# Patient Record
Sex: MALE | Race: WHITE | NOT HISPANIC OR LATINO | Employment: FULL TIME | URBAN - METROPOLITAN AREA
[De-identification: names, ages, dates, MRNs, and addresses within clinical notes are randomized per-mention and may not be internally consistent; named-entity substitution may affect disease eponyms.]

---

## 2019-04-24 ENCOUNTER — HOSPITAL ENCOUNTER (INPATIENT)
Facility: MEDICAL CENTER | Age: 39
LOS: 2 days | DRG: 564 | End: 2019-04-26
Attending: EMERGENCY MEDICINE | Admitting: SURGERY
Payer: OTHER MISCELLANEOUS

## 2019-04-24 ENCOUNTER — APPOINTMENT (OUTPATIENT)
Dept: RADIOLOGY | Facility: MEDICAL CENTER | Age: 39
DRG: 564 | End: 2019-04-24
Attending: SURGERY
Payer: OTHER MISCELLANEOUS

## 2019-04-24 ENCOUNTER — HOSPITAL ENCOUNTER (OUTPATIENT)
Dept: RADIOLOGY | Facility: MEDICAL CENTER | Age: 39
End: 2019-04-24

## 2019-04-24 DIAGNOSIS — S02.92XA CRANIAL FACIAL FRACTURES (HCC): ICD-10-CM

## 2019-04-24 DIAGNOSIS — T14.90XA TRAUMA: ICD-10-CM

## 2019-04-24 DIAGNOSIS — S02.91XA CRANIAL FACIAL FRACTURES (HCC): ICD-10-CM

## 2019-04-24 DIAGNOSIS — S06.6X9A SUBARACHNOID HEMORRHAGE FOLLOWING INJURY, WITH LOSS OF CONSCIOUSNESS, INITIAL ENCOUNTER (HCC): ICD-10-CM

## 2019-04-24 PROBLEM — S06.5XAA SDH (SUBDURAL HEMATOMA) (HCC): Status: ACTIVE | Noted: 2019-04-24

## 2019-04-24 LAB
ABO GROUP BLD: NORMAL
ALBUMIN SERPL BCP-MCNC: 4.4 G/DL (ref 3.2–4.9)
ALBUMIN/GLOB SERPL: 1.7 G/DL
ALP SERPL-CCNC: 81 U/L (ref 30–99)
ALT SERPL-CCNC: 12 U/L (ref 2–50)
ANION GAP SERPL CALC-SCNC: 9 MMOL/L (ref 0–11.9)
APTT PPP: 31.7 SEC (ref 24.7–36)
AST SERPL-CCNC: 14 U/L (ref 12–45)
BILIRUB SERPL-MCNC: 0.7 MG/DL (ref 0.1–1.5)
BLD GP AB SCN SERPL QL: NORMAL
BUN SERPL-MCNC: 15 MG/DL (ref 8–22)
CALCIUM SERPL-MCNC: 9.7 MG/DL (ref 8.5–10.5)
CFT BLD TEG: 7.6 MIN (ref 5–10)
CHLORIDE SERPL-SCNC: 103 MMOL/L (ref 96–112)
CLOT ANGLE BLD TEG: 53 DEGREES (ref 53–72)
CLOT LYSIS 30M P MA LENFR BLD TEG: 0.1 % (ref 0–8)
CO2 SERPL-SCNC: 25 MMOL/L (ref 20–33)
CREAT SERPL-MCNC: 0.96 MG/DL (ref 0.5–1.4)
CT.EXTRINSIC BLD ROTEM: 2.9 MIN (ref 1–3)
ERYTHROCYTE [DISTWIDTH] IN BLOOD BY AUTOMATED COUNT: 38.5 FL (ref 35.9–50)
ETHANOL BLD-MCNC: 0 G/DL
GLOBULIN SER CALC-MCNC: 2.6 G/DL (ref 1.9–3.5)
GLUCOSE SERPL-MCNC: 116 MG/DL (ref 65–99)
HCT VFR BLD AUTO: 44.4 % (ref 42–52)
HGB BLD-MCNC: 15.4 G/DL (ref 14–18)
INR PPP: 1.12 (ref 0.87–1.13)
MCF BLD TEG: 57.3 MM (ref 50–70)
MCH RBC QN AUTO: 29.8 PG (ref 27–33)
MCHC RBC AUTO-ENTMCNC: 34.7 G/DL (ref 33.7–35.3)
MCV RBC AUTO: 85.9 FL (ref 81.4–97.8)
PA AA BLD-ACNC: 81.6 %
PA ADP BLD-ACNC: 11.6 %
PLATELET # BLD AUTO: 191 K/UL (ref 164–446)
PMV BLD AUTO: 9.5 FL (ref 9–12.9)
POTASSIUM SERPL-SCNC: 3.7 MMOL/L (ref 3.6–5.5)
PROT SERPL-MCNC: 7 G/DL (ref 6–8.2)
PROTHROMBIN TIME: 14.5 SEC (ref 12–14.6)
RBC # BLD AUTO: 5.17 M/UL (ref 4.7–6.1)
RH BLD: NORMAL
SODIUM SERPL-SCNC: 137 MMOL/L (ref 135–145)
TEG ALGORITHM TGALG: NORMAL
WBC # BLD AUTO: 11.8 K/UL (ref 4.8–10.8)

## 2019-04-24 PROCEDURE — 70450 CT HEAD/BRAIN W/O DYE: CPT

## 2019-04-24 PROCEDURE — 85610 PROTHROMBIN TIME: CPT

## 2019-04-24 PROCEDURE — 80053 COMPREHEN METABOLIC PANEL: CPT

## 2019-04-24 PROCEDURE — 86901 BLOOD TYPING SEROLOGIC RH(D): CPT

## 2019-04-24 PROCEDURE — 94760 N-INVAS EAR/PLS OXIMETRY 1: CPT

## 2019-04-24 PROCEDURE — 80307 DRUG TEST PRSMV CHEM ANLYZR: CPT

## 2019-04-24 PROCEDURE — 99291 CRITICAL CARE FIRST HOUR: CPT

## 2019-04-24 PROCEDURE — G0390 TRAUMA RESPONS W/HOSP CRITI: HCPCS

## 2019-04-24 PROCEDURE — 85384 FIBRINOGEN ACTIVITY: CPT

## 2019-04-24 PROCEDURE — 700105 HCHG RX REV CODE 258: Performed by: SURGERY

## 2019-04-24 PROCEDURE — 86900 BLOOD TYPING SEROLOGIC ABO: CPT

## 2019-04-24 PROCEDURE — 86850 RBC ANTIBODY SCREEN: CPT

## 2019-04-24 PROCEDURE — 85347 COAGULATION TIME ACTIVATED: CPT

## 2019-04-24 PROCEDURE — 85576 BLOOD PLATELET AGGREGATION: CPT | Mod: 91

## 2019-04-24 PROCEDURE — 770022 HCHG ROOM/CARE - ICU (200)

## 2019-04-24 PROCEDURE — 85027 COMPLETE CBC AUTOMATED: CPT

## 2019-04-24 PROCEDURE — 85730 THROMBOPLASTIN TIME PARTIAL: CPT

## 2019-04-24 RX ORDER — MORPHINE SULFATE 4 MG/ML
4 INJECTION, SOLUTION INTRAMUSCULAR; INTRAVENOUS
Status: DISCONTINUED | OUTPATIENT
Start: 2019-04-24 | End: 2019-04-26 | Stop reason: HOSPADM

## 2019-04-24 RX ORDER — POLYETHYLENE GLYCOL 3350 17 G/17G
1 POWDER, FOR SOLUTION ORAL 2 TIMES DAILY
Status: DISCONTINUED | OUTPATIENT
Start: 2019-04-24 | End: 2019-04-26 | Stop reason: HOSPADM

## 2019-04-24 RX ORDER — AMOXICILLIN 250 MG
1 CAPSULE ORAL NIGHTLY
Status: DISCONTINUED | OUTPATIENT
Start: 2019-04-24 | End: 2019-04-26 | Stop reason: HOSPADM

## 2019-04-24 RX ORDER — LEVETIRACETAM 500 MG/1
500 TABLET ORAL 2 TIMES DAILY
Status: DISCONTINUED | OUTPATIENT
Start: 2019-04-25 | End: 2019-04-26 | Stop reason: HOSPADM

## 2019-04-24 RX ORDER — FAMOTIDINE 20 MG/1
20 TABLET, FILM COATED ORAL 2 TIMES DAILY
Status: DISCONTINUED | OUTPATIENT
Start: 2019-04-24 | End: 2019-04-25

## 2019-04-24 RX ORDER — BISACODYL 10 MG
10 SUPPOSITORY, RECTAL RECTAL
Status: DISCONTINUED | OUTPATIENT
Start: 2019-04-24 | End: 2019-04-26 | Stop reason: HOSPADM

## 2019-04-24 RX ORDER — SODIUM CHLORIDE 9 MG/ML
INJECTION, SOLUTION INTRAVENOUS CONTINUOUS
Status: DISCONTINUED | OUTPATIENT
Start: 2019-04-24 | End: 2019-04-26 | Stop reason: HOSPADM

## 2019-04-24 RX ORDER — AMOXICILLIN 250 MG
1 CAPSULE ORAL
Status: DISCONTINUED | OUTPATIENT
Start: 2019-04-24 | End: 2019-04-26 | Stop reason: HOSPADM

## 2019-04-24 RX ORDER — ONDANSETRON 2 MG/ML
4 INJECTION INTRAMUSCULAR; INTRAVENOUS EVERY 4 HOURS PRN
Status: DISCONTINUED | OUTPATIENT
Start: 2019-04-24 | End: 2019-04-26 | Stop reason: HOSPADM

## 2019-04-24 RX ORDER — ENEMA 19; 7 G/133ML; G/133ML
1 ENEMA RECTAL
Status: DISCONTINUED | OUTPATIENT
Start: 2019-04-24 | End: 2019-04-26 | Stop reason: HOSPADM

## 2019-04-24 RX ORDER — DOCUSATE SODIUM 100 MG/1
100 CAPSULE, LIQUID FILLED ORAL 2 TIMES DAILY
Status: DISCONTINUED | OUTPATIENT
Start: 2019-04-24 | End: 2019-04-26 | Stop reason: HOSPADM

## 2019-04-24 RX ADMIN — SODIUM CHLORIDE: 9 INJECTION, SOLUTION INTRAVENOUS at 16:16

## 2019-04-24 ASSESSMENT — PATIENT HEALTH QUESTIONNAIRE - PHQ9
1. LITTLE INTEREST OR PLEASURE IN DOING THINGS: NOT AT ALL
1. LITTLE INTEREST OR PLEASURE IN DOING THINGS: NOT AT ALL
SUM OF ALL RESPONSES TO PHQ9 QUESTIONS 1 AND 2: 0
2. FEELING DOWN, DEPRESSED, IRRITABLE, OR HOPELESS: NOT AT ALL
2. FEELING DOWN, DEPRESSED, IRRITABLE, OR HOPELESS: NOT AT ALL
SUM OF ALL RESPONSES TO PHQ9 QUESTIONS 1 AND 2: 0

## 2019-04-24 ASSESSMENT — COGNITIVE AND FUNCTIONAL STATUS - GENERAL
SUGGESTED CMS G CODE MODIFIER DAILY ACTIVITY: CH
DAILY ACTIVITIY SCORE: 24
MOBILITY SCORE: 24
SUGGESTED CMS G CODE MODIFIER MOBILITY: CH

## 2019-04-24 ASSESSMENT — LIFESTYLE VARIABLES
EVER_SMOKED: YES
ALCOHOL_USE: NO

## 2019-04-24 ASSESSMENT — COPD QUESTIONNAIRES
DURING THE PAST 4 WEEKS HOW MUCH DID YOU FEEL SHORT OF BREATH: NONE/LITTLE OF THE TIME
HAVE YOU SMOKED AT LEAST 100 CIGARETTES IN YOUR ENTIRE LIFE: NO/DON'T KNOW
DO YOU EVER COUGH UP ANY MUCUS OR PHLEGM?: NO/ONLY WITH OCCASIONAL COLDS OR INFECTIONS
COPD SCREENING SCORE: 0
IN THE PAST 12 MONTHS DO YOU DO LESS THAN YOU USED TO BECAUSE OF YOUR BREATHING PROBLEMS: DISAGREE/UNSURE

## 2019-04-24 ASSESSMENT — PAIN SCALES - WONG BAKER: WONGBAKER_NUMERICALRESPONSE: HURTS A WHOLE LOT

## 2019-04-24 NOTE — CARE PLAN
Problem: Knowledge Deficit  Goal: Knowledge of disease process/condition, treatment plan, diagnostic tests, and medications will improve    Intervention: Assess knowledge level of disease process/condition, treatment plan, diagnostic tests, and medications  Pt updated on POC. Demonstrated understanding by teach back. All questions answered.       Problem: Pain Management  Goal: Pain level will decrease to patient's comfort goal    Intervention: Follow pain managment plan developed in collaboration with patient and Interdisciplinary Team  Assess pain Q2H and prn

## 2019-04-24 NOTE — ASSESSMENT & PLAN NOTE
Comminuted minimally displaced fractures involving the bilateral frontal bone, with pneumocephalus  Non displaced fracture medial wall right orbit  Non displaced fracture of left lateral orbital wall with underlying retro-orbital air.  Posterior wall frontal sinuses disrupted.  Pneumocephalus anterior to frontal lobes.  Non-operative management.   Watch for CSF leak on posterior fractures, if found notify Dr. Degroot.  Would change plan.  Dr. Degroot - Facial fracture.

## 2019-04-24 NOTE — PROGRESS NOTES
Med Rec Updated and Complete per Pt at bedside  Allergies Reviewed   No PO ABX last 30 days.    Pt denies RX or OTC medications at this time.    Revisions were made to the Med Rec regarding the following medications:     Flexeril, Motrin, and Percocet.    PharmD notified.

## 2019-04-24 NOTE — PROGRESS NOTES
1600 - patient admit to [facility]. Report received from [place] FREEMAN.   1630 - Admit profile and assessment completed.

## 2019-04-24 NOTE — LETTER
[facility] [address] [address]  Phone: Dept: [phone] - Fax:        [facility] Progress Report and Disability Certification  Date of Service: 4/24/2019   No Show:  No  Date / Time of Next Visit:     Claim Information   Patient Name: Cecilio Melton  Claim Number:     Employer: [employer]  Date of Injury:   4/24/2019   Insurer / TPA:   ID / SSN:    Occupation:  Diagnosis: Diagnoses of Trauma, Subarachnoid hemorrhage following injury, with loss of consciousness, initial encounter ([facility]), and Cranial facial fractures (HCC) were pertinent to this visit.    Medical Information   Related to Industrial Injury?    Subjective Complaints:      Objective Findings:     Pre-Existing Condition(s):     Assessment:        Status:    Permanent Disability:     Plan:      Diagnostics:      Comments:       Disability Information   Status:      From:     Through:   Restrictions are:     Physical Restrictions   Sitting:    Standing:    Stooping:    Bending:      Squatting:    Walking:    Climbing:    Pushing:      Pulling:    Other:    Reaching Above Shoulder (L):   Reaching Above Shoulder (R):       Reaching Below Shoulder (L):    Reaching Below Shoulder (R):      Not to exceed Weight Limits   Carrying(hrs):   Weight Limit(lb):   Lifting(hrs):   Weight  Limit(lb):     Comments:      Repetitive Actions   Hands: i.e. Fine Manipulations from Grasping:     Feet: i.e. Operating Foot Controls:     Driving / Operate Machinery:     Physician Name: Serjio Osei Physician Signature:   e-Signature:  , Medical Director   Clinic Name / Location: [address] [id number] [phone]     Clinic Phone Number: Dept: [phone]   Appointment Time:  Visit Start Time:    Check-In Time:  3:03 PM Visit Discharge Time: 9:30 AM   Original-Treating Physician or Chiropractor    Page 2-Insurer/TPA    Page  3-Employer    Page 4-Employee

## 2019-04-24 NOTE — LETTER
[facility]   [address] [place] [address]  Phone: Dept: [phone] - Fax:        [facility] Progress Report and Disability Certification  Date of Service: 4/24/2019   No Show:  No  Date / Time of Next Visit:     Claim Information   Patient Name: Cecilio Melton  Claim Number:     Employer: [employer]  Date of Injury:      Insurer / TPA:   ID / SSN: [id number]    Occupation:  Diagnosis: There were no encounter diagnoses.    Medical Information   Related to Industrial Injury?   ***   Subjective Complaints:      Objective Findings:     Pre-Existing Condition(s):     Assessment:        Status:    Permanent Disability:     Plan:      Diagnostics:      Comments:       Disability Information   Status:      From:     Through:   Restrictions are:     Physical Restrictions   Sitting:    Standing:    Stooping:    Bending:      Squatting:    Walking:    Climbing:    Pushing:      Pulling:    Other:    Reaching Above Shoulder (L):   Reaching Above Shoulder (R):       Reaching Below Shoulder (L):    Reaching Below Shoulder (R):      Not to exceed Weight Limits   Carrying(hrs):   Weight Limit(lb):   Lifting(hrs):   Weight  Limit(lb):     Comments:      Repetitive Actions   Hands: i.e. Fine Manipulations from Grasping:     Feet: i.e. Operating Foot Controls:     Driving / Operate Machinery:     Physician Name: Serjio Osei Physician Signature:   e-Signature:  , Medical Director   Clinic Name / Location: [facility]  [address] [place] [id number] [phone]     Clinic Phone Number: Dept: [phone]   Appointment Time:  Visit Start Time:    Check-In Time:  3:03 PM Visit Discharge Time:    Original-Treating Physician or Chiropractor    Page 2-Insurer/TPA    Page 3-Employer    Page 4-Employee

## 2019-04-24 NOTE — ED PROVIDER NOTES
ED Provider Note    CHIEF COMPLAINT  No chief complaint on file.      HPI  Cecilio Melton is a 38 y.o. male who presents as a trauma green on transfer from [facility].  Patient works as a , and was on a job at work, when a large branch fell from about 80 feet and struck him on the top of his head.  The patient did have a positive loss of consciousness.  After several minutes he was responsive, was able to stand and walk without difficulty.  He was complaining of a headache and neck pain and was taken to the outside hospital.  CT scan of the head showed bilateral frontal bone fractures with subarachnoid and intraparenchymal hemorrhage.  CT scan of the facial bones shows comminuted displaced fractures of the bilateral frontal bones with extension into the frontal sinuses as well as posteriorly into the bilateral superior orbital walls.  The patient was transferred to this facility for further care.  On arrival he is awake, alert, complains of a headache, and pain to his left shoulder.  He is able to move his arms and legs without difficulty, denies any numbness or tingling or any focal weakness.  Patient denies any previous medical history.    REVIEW OF SYSTEMS  See HPI for further details. All other systems are negative.     PAST MEDICAL HISTORY  No past medical history on file.    FAMILY HISTORY  No family history on file.    SOCIAL HISTORY  Social History     Social History   • Marital status:      Spouse name: N/A   • Number of children: N/A   • Years of education: N/A     Social History Main Topics   • Smoking status: Former Smoker     Packs/day: 1.00     Types: Cigarettes     Quit date: 7/19/2016   • Smokeless tobacco: Not on file   • Alcohol use Yes   • Drug use: Yes   • Sexual activity: Yes     Partners: Female     Other Topics Concern   • Not on file     Social History Narrative   • No narrative on file       SURGICAL HISTORY  No past surgical history on file.    CURRENT  MEDICATIONS  Home Medications    **Home medications have not yet been reviewed for this encounter**         ALLERGIES  No Known Allergies    PHYSICAL EXAM  VITAL SIGNS: /68   Pulse 65   Temp 36.2 °C (97.1 °F) (Temporal)   Resp 14   Wt 72.6 kg (160 lb)   SpO2 100%   BMI 22.32 kg/m²   Constitutional: Awake, alert, in no acute distress, Non-toxic appearance.   HENT: There is an abrasion over the upper forehead with tenderness.  There is ecchymosis developing over the periorbital areas, right greater than left.  Bilateral external ears normal, mucous membranes moist, throat nonerythematous without exudates, nose is normal.  No loose teeth or malocclusion.  No tenderness to the mandible.  Patient is able to open and close his mouth without difficulty.  Eyes: PERRL, EOMI, conjunctiva moist, noninjected.  Neck: Nontender, Normal range of motion, No nuchal rigidity, No stridor.   Lymphatic: No lymphadenopathy noted.   Cardiovascular: Regular rate and rhythm, no murmurs, rubs, gallops.  Thorax & Lungs:  Good breath sounds bilaterally, no wheezes, rales, or retractions.  No chest tenderness.  Abdomen: Bowel sounds normal, Soft, nontender, nondistended, no rebound, guarding, masses.  Back: No CVA or spinal tenderness.  Extremities: Intact distal pulses, No edema, there is an abrasion over the posterior shoulder with some slight tenderness, no bony deformity.  Patient is able to flex and extend, and abduct and adductor the shoulder without difficulty.  No tenderness to the right upper extremity or to the lower extremities.  Skin: Warm, Dry, No rashes.  Abrasions noted over the forehead, left shoulder.  Musculoskeletal: No tenderness to the right shoulder, clavicles, chest wall, ribs, pelvis, or lower extremities.  Neurologic: Alert & oriented x 3, cranial nerves II through XII intact, speech is fluent, no facial asymmetry, sensory and motor function normal. No focal deficits.   Psychiatric: Affect normal, Judgment  normal, Mood normal.       RADIOLOGY/PROCEDURES  CT scans from the outside hospitall    CT scan cervical spine is negative for fracture.    CT scan maxillofacial with reconstruction shows multiple facial bone fractures, pneumocephalus, retro-orbital air.      CT scan of the head without contrast shows frontal bone fracture with pneumocephalus.  Subarachnoid hemorrhage within the anterior frontal lobes.  3 mm intraparenchymal hematoma/hemorrhage in the left periventricular white matter.    COURSE & MEDICAL DECISION MAKING  Pertinent Labs & Imaging studies reviewed. (See chart for details)  The patient presents as a trauma green.  On arrival he is awake, alert, neurologically intact.   trauma surgery was present shortly after arrival.  The CAT scans from the outside hospital were reviewed.  Dr. Perdomo of neurosurgery, Dr. Degroot facial fractures were consulted and will see the patient.  CBC showed white count 11,800, hemoglobin 15.4, platelets 191, chemistry shows a glucose of 116, otherwise normal, INR 1.12.  Total care time was 35 minutes.    FINAL IMPRESSION  1.  Subarachnoid hemorrhage  2.  Intraparenchymal hemorrhage  3.  Bilateral frontal bone fractures  4.  Pneumocephalus  5.  Multiple facial bone fractures         Electronically signed by: Serjio Osei, 4/24/2019 3:42 PM

## 2019-04-24 NOTE — ED NOTES
BIB EMS to [place], tx from [place], pt was working on a tree, was on the ground when a 2f x 8inc branch from about 100 ft up fell and hit him in the head knocking him him forward onto the ground.  Pt had a +LOC for 5 minutes, was AOx4 after waking up, was given 650mg of aspirin by bystanders.  In the hospital was given 2G ancef and 8mg zofran.  VSS, c/o of 7/10 head pain, pain meds offer, pt refused at this time.

## 2019-04-24 NOTE — ASSESSMENT & PLAN NOTE
Subarachnoid hemorrhage  Non-operative management.   Repeat head CT with slight interval increase in extra-axial and intra-axial hemorrhage involving the anterior cranial fossa floor and inferior right frontal lobe, otherwise stable  Post traumatic pharmacologic seizure prophylaxis for 1 week. (4/30/19)  SLP for cog evaluation pending  Redd Perdomo MD. Neurosurgery

## 2019-04-25 ENCOUNTER — APPOINTMENT (OUTPATIENT)
Dept: RADIOLOGY | Facility: MEDICAL CENTER | Age: 39
DRG: 564 | End: 2019-04-25
Attending: NEUROLOGICAL SURGERY
Payer: OTHER MISCELLANEOUS

## 2019-04-25 PROBLEM — S06.6XAA SUBARACHNOID HEMORRHAGE FOLLOWING INJURY (HCC): Status: ACTIVE | Noted: 2019-04-24

## 2019-04-25 LAB
ABO GROUP BLD: NORMAL
ALBUMIN SERPL BCP-MCNC: 4.2 G/DL (ref 3.2–4.9)
ALBUMIN/GLOB SERPL: 1.8 G/DL
ALP SERPL-CCNC: 77 U/L (ref 30–99)
ALT SERPL-CCNC: 10 U/L (ref 2–50)
ANION GAP SERPL CALC-SCNC: 10 MMOL/L (ref 0–11.9)
AST SERPL-CCNC: 17 U/L (ref 12–45)
BASOPHILS # BLD AUTO: 0.3 % (ref 0–1.8)
BASOPHILS # BLD: 0.03 K/UL (ref 0–0.12)
BILIRUB SERPL-MCNC: 0.9 MG/DL (ref 0.1–1.5)
BUN SERPL-MCNC: 12 MG/DL (ref 8–22)
CALCIUM SERPL-MCNC: 9.3 MG/DL (ref 8.5–10.5)
CHLORIDE SERPL-SCNC: 106 MMOL/L (ref 96–112)
CO2 SERPL-SCNC: 21 MMOL/L (ref 20–33)
CREAT SERPL-MCNC: 0.82 MG/DL (ref 0.5–1.4)
EOSINOPHIL # BLD AUTO: 0.01 K/UL (ref 0–0.51)
EOSINOPHIL NFR BLD: 0.1 % (ref 0–6.9)
ERYTHROCYTE [DISTWIDTH] IN BLOOD BY AUTOMATED COUNT: 38.8 FL (ref 35.9–50)
GLOBULIN SER CALC-MCNC: 2.4 G/DL (ref 1.9–3.5)
GLUCOSE SERPL-MCNC: 111 MG/DL (ref 65–99)
HCT VFR BLD AUTO: 44 % (ref 42–52)
HGB BLD-MCNC: 15.2 G/DL (ref 14–18)
IMM GRANULOCYTES # BLD AUTO: 0.04 K/UL (ref 0–0.11)
IMM GRANULOCYTES NFR BLD AUTO: 0.4 % (ref 0–0.9)
LYMPHOCYTES # BLD AUTO: 1.15 K/UL (ref 1–4.8)
LYMPHOCYTES NFR BLD: 11.6 % (ref 22–41)
MAGNESIUM SERPL-MCNC: 2 MG/DL (ref 1.5–2.5)
MCH RBC QN AUTO: 29.9 PG (ref 27–33)
MCHC RBC AUTO-ENTMCNC: 34.5 G/DL (ref 33.7–35.3)
MCV RBC AUTO: 86.4 FL (ref 81.4–97.8)
MONOCYTES # BLD AUTO: 0.7 K/UL (ref 0–0.85)
MONOCYTES NFR BLD AUTO: 7 % (ref 0–13.4)
NEUTROPHILS # BLD AUTO: 8 K/UL (ref 1.82–7.42)
NEUTROPHILS NFR BLD: 80.6 % (ref 44–72)
NRBC # BLD AUTO: 0 K/UL
NRBC BLD-RTO: 0 /100 WBC
PHOSPHATE SERPL-MCNC: 3.6 MG/DL (ref 2.5–4.5)
PLATELET # BLD AUTO: 182 K/UL (ref 164–446)
PMV BLD AUTO: 9.8 FL (ref 9–12.9)
POTASSIUM SERPL-SCNC: 3.8 MMOL/L (ref 3.6–5.5)
PROT SERPL-MCNC: 6.6 G/DL (ref 6–8.2)
RBC # BLD AUTO: 5.09 M/UL (ref 4.7–6.1)
RH BLD: NORMAL
SODIUM SERPL-SCNC: 137 MMOL/L (ref 135–145)
WBC # BLD AUTO: 9.9 K/UL (ref 4.8–10.8)

## 2019-04-25 PROCEDURE — A9270 NON-COVERED ITEM OR SERVICE: HCPCS | Performed by: SURGERY

## 2019-04-25 PROCEDURE — 700102 HCHG RX REV CODE 250 W/ 637 OVERRIDE(OP): Performed by: SURGERY

## 2019-04-25 PROCEDURE — 99233 SBSQ HOSP IP/OBS HIGH 50: CPT | Performed by: SURGERY

## 2019-04-25 PROCEDURE — 83735 ASSAY OF MAGNESIUM: CPT

## 2019-04-25 PROCEDURE — 770006 HCHG ROOM/CARE - MED/SURG/GYN SEMI*

## 2019-04-25 PROCEDURE — A9270 NON-COVERED ITEM OR SERVICE: HCPCS | Performed by: NURSE PRACTITIONER

## 2019-04-25 PROCEDURE — 80053 COMPREHEN METABOLIC PANEL: CPT

## 2019-04-25 PROCEDURE — 70450 CT HEAD/BRAIN W/O DYE: CPT

## 2019-04-25 PROCEDURE — 85025 COMPLETE CBC W/AUTO DIFF WBC: CPT

## 2019-04-25 PROCEDURE — 700102 HCHG RX REV CODE 250 W/ 637 OVERRIDE(OP): Performed by: NURSE PRACTITIONER

## 2019-04-25 PROCEDURE — 84100 ASSAY OF PHOSPHORUS: CPT

## 2019-04-25 RX ORDER — BUTALBITAL, ACETAMINOPHEN AND CAFFEINE 50; 325; 40 MG/1; MG/1; MG/1
1 TABLET ORAL EVERY 6 HOURS PRN
Status: DISCONTINUED | OUTPATIENT
Start: 2019-04-25 | End: 2019-04-26 | Stop reason: HOSPADM

## 2019-04-25 RX ORDER — ACETAMINOPHEN 325 MG/1
650 TABLET ORAL EVERY 4 HOURS PRN
Status: DISCONTINUED | OUTPATIENT
Start: 2019-04-25 | End: 2019-04-26 | Stop reason: HOSPADM

## 2019-04-25 RX ADMIN — LEVETIRACETAM 500 MG: 500 TABLET ORAL at 05:52

## 2019-04-25 RX ADMIN — FAMOTIDINE 20 MG: 20 TABLET ORAL at 05:52

## 2019-04-25 RX ADMIN — ACETAMINOPHEN 650 MG: 325 TABLET, FILM COATED ORAL at 12:38

## 2019-04-25 RX ADMIN — LEVETIRACETAM 500 MG: 500 TABLET ORAL at 18:25

## 2019-04-25 RX ADMIN — ACETAMINOPHEN 650 MG: 325 TABLET, FILM COATED ORAL at 18:22

## 2019-04-25 RX ADMIN — ACETAMINOPHEN 650 MG: 325 TABLET, FILM COATED ORAL at 05:52

## 2019-04-25 ASSESSMENT — ENCOUNTER SYMPTOMS
BACK PAIN: 0
SHORTNESS OF BREATH: 0
BLURRED VISION: 0
ABDOMINAL PAIN: 0
POLYDIPSIA: 0
FEVER: 0
SPEECH CHANGE: 0
SENSORY CHANGE: 0
NECK PAIN: 0

## 2019-04-25 ASSESSMENT — LIFESTYLE VARIABLES: SUBSTANCE_ABUSE: 0

## 2019-04-25 NOTE — PROGRESS NOTES
1715 - Dr. Perdomo at bedside, assessed patient. Pt updated and educated pt on POC. Pt demonstrated understanding by teach back. All questions answered. Dr. Perdomo discussed POC with RN. Patient OK to advance diet as tolerated, Q1H neuro checks, and OK to dc keppra.     1720 - Dr. Lees updated Dr. Perdomo came and assessed patient and Dr. Perdomo OK'd to start diet and ordered DC keppra. Per Dr. Lees OK to dc keppra if CT follow up at 1800 is stable and Ok to order diet, advanced as tolerated.

## 2019-04-25 NOTE — DISCHARGE PLANNING
Care Transition Team Assessment    SW Intern met with pt and pt's gf at bedside and obtained the information used in this assessment. Pt verified accuracy of facesheet. Pt lives in a home with his gf. Prior to current hospitalization, pt was completely independent in ADLS/IADLS. Pt drives and is able to attend necessary MD appointments. Pt works full-time and is insured. Pt has no financial concerns. Pt has a good support system. Pt denies any hx of substance use and denies any dx of mh. Due to this being a work-related injury, pt's claim will go through workers comp.       Information Source  Orientation : Oriented x 4  Information Given By: Patient  Who is responsible for making decisions for patient? : Patient    Readmission Evaluation  Is this a readmission?: No    Elopement Risk  Legal Hold: No  Ambulatory or Self Mobile in Wheelchair: No-Not an Elopement Risk  Elopement Risk: Not at Risk for Elopement    Interdisciplinary Discharge Planning  Does Admitting Nurse Feel This Could be a Complex Discharge?: No  Lives with - Patient's Self Care Capacity: Significant Other  Patient or legal guardian wants to designate a caregiver (see row info): No  Support Systems: Family Member(s), Friends / Neighbors  Housing / Facility: 2 Story House  Able to Return to Previous ADL's: Yes  Mobility Issues: No  Prior Services: None  Assistance Needed: No    Discharge Preparedness  What is your plan after discharge?: Uncertain - pending medical team collaboration, Home with help  What are your discharge supports?: Partner  Prior Functional Level: Ambulatory, Drives Self, Independent with Activities of Daily Living, Independent with Medication Management  Difficulity with ADLs: Walking  Difficulity with IADLs: Driving    Functional Assesment  Prior Functional Level: Ambulatory, Drives Self, Independent with Activities of Daily Living, Independent with Medication Management    Finances  Financial Barriers to Discharge:  No  Prescription Coverage: Yes              Advance Directive  Advance Directive?: None         Psychological Assessment  History of Substance Abuse: None  History of Psychiatric Problems: No  Non-compliant with Treatment: No  Newly Diagnosed Illness: Yes         Anticipated Discharge Information  Anticipated discharge disposition: Discharge needs currently unknown, Home

## 2019-04-25 NOTE — H&P
DATE OF ADMISSION:  04/24/2019    IDENTIFICATION:  A 38-year-old male.    HISTORY OF PRESENT ILLNESS:  Patient was apparently working as a    when a tree fell and hit him from approximately 100 feet up and hit him in the   head knocking forward.  He did have a loss of consciousness for approximately   5 minutes.  He was given aspirin in the field and then transferred to [facility] where he was found to have multiple intracranial hemorrhages   as well as facial fractures.  He was transferred to [facility] for   further evaluation.    PAST MEDICAL HISTORY:  Illnesses:  None.    PAST SURGICAL HISTORY:  None.    MEDICATIONS:  None.    ALLERGIES:  None.    SOCIAL HISTORY:  He does not smoke.  He does drink occasionally.  He does   smoke marijuana.    REVIEW OF SYSTEMS:  Not obtained because he is a trauma.    PHYSICAL EXAMINATION:  VITAL SIGNS:  His blood pressure is 120/68, heart rate is in the 60s.  GENERAL:  He is alert and cooperative.  HEENT:  Head, he has a right periorbital ecchymosis.  His pupils are 3 mm.  He   has some tenderness over his frontal sinuses as well as his mid face.  NECK:  His neck is in a C-collar, but is nontender.  He has full range of   motion.  CHEST:  Nontender.  ABDOMEN:  Soft.  PELVIS:  Stable.  EXTREMITIES:  Without evidence of deformity.  He is moving all extremities.  NEUROLOGIC:  GCS of 15.    DIAGNOSTIC DATA:  Head CT demonstrated him to have a subdural hematoma.    Facial fractures demonstrated a minimally displaced fracture of the bilateral   frontal bones with pneumocephalus, displaced medial wall right orbital   fracture, left lateral orbital fracture as well.    IMPRESSION:  A 38-year-old male, status post a tree to the head with   intracranial hemorrhage and multiple facial fractures.    PLAN:  Will be admission to the ICU.  He will be seen by Dr. Perdomo from   neurosurgery and Dr. Degroot from maxillofacial for evaluation of his injuries.     We will do a followup head CT in 6 hours.  We will also check his coagulation   factors as well as serial labs to make sure nothing is there to monitor him.       ____________________________________     MD ABBE[address] L[address] / ROSELIA    DD:  04/24/2019 16:06:17  DT:  04/24/2019 18:00:25    D#:  [id number]  Job#:  [id number]

## 2019-04-25 NOTE — PROGRESS NOTES
Trauma Progress Note 4/25/2019 5:39 AM    Briefly, this is a 38 y.o. male with traumatic subarachnoid hemorrhage, bilateral frontal skull fractures and multiple facial fractures sustained after being struck by a tree branch and subsequent fall .    /68   Pulse 70   Temp 36.7 °C (98 °F) (Temporal)   Resp (!) 45   Wt 69.7 kg (153 lb 10.6 oz)   SpO2 99%   BMI 21.43 kg/m²     Urine Output: voiding adequate amount    Assessment: resting, alert and oriented, neuro checks stable through the night, patient declined narcotic medication all night, Tylenol ordered for pain. No CSF leak noted. Non-op management of facial fractures.    Recent Labs      04/24/19   1521   APTT  31.7   INR  1.12      Recent Labs      04/24/19   1521   REACTMIN  7.6   CLOTKINET  2.9   CLOTANGL  53.0   MAXCLOTS  57.3   [id number]  0.1   PRCINADP  11.6   PRCINAA  81.6     Trauma  Pt vs tree branch  Trauma Green Transfer Activation.  Marizol Lees MD. Trauma Surgery.    Subarachnoid hemorrhage following injury (HCC)  Subarachnoid hemorrhage  Non-operative management.   Repeat head CT with slight interval increase in extra-axial and intra-axial hemorrhage involving the anterior cranial fossa floor and inferior right frontal lobe, otherwise stable  Post traumatic pharmacologic seizure prophylaxis for 1 week.  SLP for [facility] eval.  Redd Perdomo MD. Neurosurgery    Cranial facial fractures (HCC)  Comminuted minimally displaced fractures involving the bilateral frontal bone, with pneumocephalus  Non displaced fracture medial wall right orbit  Non displaced fracture of left lateral orbital wall with underlying retro-orbital air.  Posterior wall frontal sinuses disrupted.  Pneumocephalus anterior to frontal lobes.  Non-operative management.  Dr. Degroot - Facial fracture

## 2019-04-25 NOTE — PROGRESS NOTES
Trauma / Surgical Daily Progress Note    Date of Service  4/25/2019    Chief Complaint  38 y.o. male admitted 4/24/2019 with SAH - Hit by branch cut from tree    Interval Events  Tertiary survey completed, with no further findings.  RAP/SBIRT completed.   Pt lives in [place], works as an .   Add fioricet for headaches  Medically cleared for transfer to neurosurgery unit    Review of Systems  Review of Systems   Constitutional: Negative for fever.   HENT: Negative for hearing loss.    Eyes: Negative for blurred vision.   Respiratory: Negative for shortness of breath.    Cardiovascular: Negative for chest pain.   Gastrointestinal: Negative for abdominal pain.   Genitourinary:        Voiding     Musculoskeletal: Negative for back pain, joint pain and neck pain.   Skin: Negative for rash.   Neurological: Negative for sensory change and speech change.   Endo/Heme/Allergies: Negative for polydipsia.   Psychiatric/Behavioral: Negative for substance abuse.        Vital Signs  Temp:  [36.2 °C (97.1 °F)-37.4 °C (99.3 °F)] 36.8 °C (98.3 °F)  Pulse:  [45-89] 52  Resp:  [10-46] 12  BP: (117-128)/(68) 117/68  SpO2:  [93 %-100 %] 97 %    Physical Exam  Physical Exam   Constitutional: He is oriented to person, place, and time. He appears well-nourished.   HENT:   Head: Atraumatic.   Eyes: Pupils are equal, round, and reactive to light.   Bilateral eyes edematous with outlining ecchymosis.  Able to open    Neck: Normal range of motion.   Cardiovascular: Normal rate.    Pulmonary/Chest: Effort normal.   Abdominal: Soft. He exhibits no distension. There is no tenderness.   Musculoskeletal: Normal range of motion. He exhibits no edema or tenderness.   Neurological: He is alert and oriented to person, place, and time.   Skin: Skin is warm.   Psychiatric: His behavior is normal.       Laboratory  Recent Results (from the past 24 hour(s))   DIAGNOSTIC ALCOHOL    Collection Time: 04/24/19  3:21 PM   Result Value Ref Range     Diagnostic Alcohol 0.00 0.00 g/dL   CBC WITHOUT DIFFERENTIAL    Collection Time: 04/24/19  3:21 PM   Result Value Ref Range    WBC 11.8 (H) 4.8 - 10.8 K/uL    RBC 5.17 4.70 - 6.10 M/uL    Hemoglobin 15.4 14.0 - 18.0 g/dL    Hematocrit 44.4 42.0 - 52.0 %    MCV 85.9 81.4 - 97.8 fL    MCH 29.8 27.0 - 33.0 pg    MCHC 34.7 33.7 - 35.3 g/dL    RDW 38.5 35.9 - 50.0 fL    Platelet Count 191 164 - 446 K/uL    MPV 9.5 9.0 - 12.9 fL   Comp Metabolic Panel    Collection Time: 04/24/19  3:21 PM   Result Value Ref Range    Sodium 137 135 - 145 mmol/L    Potassium 3.7 3.6 - 5.5 mmol/L    Chloride 103 96 - 112 mmol/L    Co2 25 20 - 33 mmol/L    Anion Gap 9.0 0.0 - 11.9    Glucose 116 (H) 65 - 99 mg/dL    Bun 15 8 - 22 mg/dL    Creatinine 0.96 0.50 - 1.40 mg/dL    Calcium 9.7 8.5 - 10.5 mg/dL    AST(SGOT) 14 12 - 45 U/L    ALT(SGPT) 12 2 - 50 U/L    Alkaline Phosphatase 81 30 - 99 U/L    Total Bilirubin 0.7 0.1 - 1.5 mg/dL    Albumin 4.4 3.2 - 4.9 g/dL    Total Protein 7.0 6.0 - 8.2 g/dL    Globulin 2.6 1.9 - 3.5 g/dL    A-G Ratio 1.7 g/dL   Prothrombin Time    Collection Time: 04/24/19  3:21 PM   Result Value Ref Range    PT 14.5 12.0 - 14.6 sec    INR 1.12 0.87 - 1.13   APTT    Collection Time: 04/24/19  3:21 PM   Result Value Ref Range    APTT 31.7 24.7 - 36.0 sec   PLATELET MAPPING WITH BASIC TEG    Collection Time: 04/24/19  3:21 PM   Result Value Ref Range    Reaction Time Initial-R 7.6 5.0 - 10.0 min    Clot Kinetics-K 2.9 1.0 - 3.0 min    Clot Angle-Angle 53.0 53.0 - 72.0 degrees    Maximum Clot Strength-MA 57.3 50.0 - 70.0 mm    Lysis 30 minutes-LY30 0.1 0.0 - 8.0 %    % Inhibition ADP 11.6 %    % Inhibition AA 81.6 %    TEG Algorithm Link Algorithm    COD - Adult (Type and Screen)    Collection Time: 04/24/19  3:21 PM   Result Value Ref Range    ABO Grouping Only B     Rh Grouping Only POS     Antibody Screen-Cod NEG    ESTIMATED GFR    Collection Time: 04/24/19  3:21 PM   Result Value Ref Range    GFR If   >60 >60 mL/min/1.73 m 2    GFR If Non African American >60 >60 mL/min/1.73 m 2   ABO AND RH CONFIRMATION    Collection Time: 04/25/19  3:20 AM   Result Value Ref Range    ABO Confirm B     Second Rh Group POS    CBC with Differential: Tomorrow AM    Collection Time: 04/25/19  3:20 AM   Result Value Ref Range    WBC 9.9 4.8 - 10.8 K/uL    RBC 5.09 4.70 - 6.10 M/uL    Hemoglobin 15.2 14.0 - 18.0 g/dL    Hematocrit 44.0 42.0 - 52.0 %    MCV 86.4 81.4 - 97.8 fL    MCH 29.9 27.0 - 33.0 pg    MCHC 34.5 33.7 - 35.3 g/dL    RDW 38.8 35.9 - 50.0 fL    Platelet Count 182 164 - 446 K/uL    MPV 9.8 9.0 - 12.9 fL    Neutrophils-Polys 80.60 (H) 44.00 - 72.00 %    Lymphocytes 11.60 (L) 22.00 - 41.00 %    Monocytes 7.00 0.00 - 13.40 %    Eosinophils 0.10 0.00 - 6.90 %    Basophils 0.30 0.00 - 1.80 %    Immature Granulocytes 0.40 0.00 - 0.90 %    Nucleated RBC 0.00 /100 WBC    Neutrophils (Absolute) 8.00 (H) 1.82 - 7.42 K/uL    Lymphs (Absolute) 1.15 1.00 - 4.80 K/uL    Monos (Absolute) 0.70 0.00 - 0.85 K/uL    Eos (Absolute) 0.01 0.00 - 0.51 K/uL    Baso (Absolute) 0.03 0.00 - 0.12 K/uL    Immature Granulocytes (abs) 0.04 0.00 - 0.11 K/uL    NRBC (Absolute) 0.00 K/uL   Magnesium: Every Monday and Thursday AM    Collection Time: 04/25/19  3:20 AM   Result Value Ref Range    Magnesium 2.0 1.5 - 2.5 mg/dL   Phosphorus: Every Monday and Thursday AM    Collection Time: 04/25/19  3:20 AM   Result Value Ref Range    Phosphorus 3.6 2.5 - 4.5 mg/dL   Comp Metabolic Panel    Collection Time: 04/25/19  3:20 AM   Result Value Ref Range    Sodium 137 135 - 145 mmol/L    Potassium 3.8 3.6 - 5.5 mmol/L    Chloride 106 96 - 112 mmol/L    Co2 21 20 - 33 mmol/L    Anion Gap 10.0 0.0 - 11.9    Glucose 111 (H) 65 - 99 mg/dL    Bun 12 8 - 22 mg/dL    Creatinine 0.82 0.50 - 1.40 mg/dL    Calcium 9.3 8.5 - 10.5 mg/dL    AST(SGOT) 17 12 - 45 U/L    ALT(SGPT) 10 2 - 50 U/L    Alkaline Phosphatase 77 30 - 99 U/L    Total Bilirubin 0.9 0.1 - 1.5 mg/dL     Albumin 4.2 3.2 - 4.9 g/dL    Total Protein 6.6 6.0 - 8.2 g/dL    Globulin 2.4 1.9 - 3.5 g/dL    A-G Ratio 1.8 g/dL   ESTIMATED GFR    Collection Time: 04/25/19  3:20 AM   Result Value Ref Range    GFR If African American >60 >60 mL/min/1.73 m 2    GFR If Non African American >60 >60 mL/min/1.73 m 2       Fluids    Intake/Output Summary (Last 24 hours) at 04/25/19 1147  Last data filed at 04/25/19 1000   Gross per 24 hour   Intake          2193.33 ml   Output             1925 ml   Net           268.33 ml       Core Measures & Quality Metrics  Labs reviewed, Medications reviewed and Radiology images reviewed  Sage catheter: No Sage      DVT Prophylaxis: Contraindicated - High bleeding risk    Ulcer prophylaxis: Not indicated    Assessed for rehab: Patient returned to prior level of function, rehabilitation not indicated at this time    Total Score: 3    ETOH Screening     Assessment complete date: 4/25/2019        Assessment/Plan  Cranial facial fractures (HCC)- (present on admission)   Assessment & Plan    Comminuted minimally displaced fractures involving the bilateral frontal bone, with pneumocephalus  Non displaced fracture medial wall right orbit  Non displaced fracture of left lateral orbital wall with underlying retro-orbital air.  Posterior wall frontal sinuses disrupted.  Pneumocephalus anterior to frontal lobes.  Non-operative management.   Watch for CSF leak on posterior fractures, if found notify Dr. Degroot.  Would change plan.  Dr. Degroot - Facial fracture.       Subarachnoid hemorrhage following injury (HCC)- (present on admission)   Assessment & Plan    Subarachnoid hemorrhage  Non-operative management.   Repeat head CT with slight interval increase in extra-axial and intra-axial hemorrhage involving the anterior cranial fossa floor and inferior right frontal lobe, otherwise stable  Post traumatic pharmacologic seizure prophylaxis for 1 week. (4/30/19)  SLP for cog evaluation pending  Redd Perdomo  MD. Neurosurgery     Trauma- (present on admission)   Assessment & Plan    Pt vs tree branch  Trauma Green Transfer Activation.  Marizol Lees MD. Trauma Surgery.       Discussed patient condition with RN, Patient and trauma surgery. Dr. ZOFIA Barbour    Patient seen, data reviewed and discussed.  Agree with assessment and plan.  LEX

## 2019-04-25 NOTE — PROGRESS NOTES
1700: Dr. Degroot at bedside, assessed patient. Pt updated and educated on POC. Pt verbalized understanding. All questions answered.

## 2019-04-25 NOTE — CONSULTS
Neurosurgery Consult Note    Patient: Cecilio Melton    MRN: [id number]    Date of Consultation: 4/24/2019    Reason for Consultation: Bilateral frontal skull fractures with traumatic subarachnoid hemorrhage    Referring Physician: Dr. Serjio Osei    History of Present Illness:  Patient is a 38-year-old  who was working under a tree standing on the ground when a large branch from above fell from about 100 feet landing on his head and knocking him forward to the ground.  He was reported to be unconscious for several minutes and then was alert and oriented afterwards.  He is complaining of a headache and left sided neck discomfort.    Review of Systems:  Constitutional: Denies fevers, chills, night sweats, or weight changes  Eyes: Denies changes in vision, or eye pain  Ears/Nose/Throat/Mouth: Denies nasal congestion or sore throat   Cardiovascular: Denies chest pain or palpitations   Respiratory: Denies shortness of breath, or cough  Gastrointestinal/Hepatic: Denies abdominal pain, nausea, vomiting, diarrhea, or constipation  Genitourinary: Denies dysuria, frequency, or incontinence  Musculoskeletal/Rheum: Denies joint pain or swelling   Skin: Denies rash  Neurological: Denies confusion, memory loss, focal weakness, or parasthesias  Psychiatric: Denies mood disorder   Endocrine: Denies hx of diabetes or thyroid dysfunction  Heme/Oncology/Lymph Nodes: Denies enlarged lymph nodes, bruising, or known bleeding disorder  Allergic/Immunologic: Denies hx of autoimmune disorder    Medications:  Current Facility-Administered Medications   Medication Dose Route Frequency Provider Last Rate Last Dose   • Respiratory Care per Protocol   Nebulization Continuous RT Marizol Lees M.D.       • Pharmacy Consult Request ...Pain Management Review 1 Each  1 Each Other PHARMACY TO DOSE Marizol Lees M.D.       • docusate sodium (COLACE) capsule 100 mg  100 mg Oral BID Marizol Lees M.D.       • senna-docusate (PERICOLACE or  SENOKOT S) 8.6-50 MG per tablet 1 Tab  1 Tab Oral Nightly Marizol Lees M.D.       • senna-docusate (PERICOLACE or SENOKOT S) 8.6-50 MG per tablet 1 Tab  1 Tab Oral Q24HRS PRN Marizol Lees M.D.       • polyethylene glycol/lytes (MIRALAX) PACKET 1 Packet  1 Packet Oral BID Marizol Lees M.D.       • [START ON 4/25/2019] magnesium hydroxide (MILK OF MAGNESIA) suspension 30 mL  30 mL Oral DAILY Marizol Lees M.D.       • bisacodyl (DULCOLAX) suppository 10 mg  10 mg Rectal Q24HRS PRN Marizol Lees M.D.       • fleet enema 133 mL  1 Each Rectal Once PRN Marizol Lees M.D.       • NS infusion   Intravenous Continuous Marizol Lese M.D. 100 mL/hr at 04/24/19 1616     • morphine (pf) 4 mg/ml injection 4 mg  4 mg Intravenous Q HOUR PRN Marizol Lees M.D.       • famotidine (PEPCID) tablet 20 mg  20 mg Oral BID Marizol Lees M.D.        Or   • famotidine (PEPCID) injection 20 mg  20 mg Intravenous BID Marizol Lees M.D.       • ondansetron (ZOFRAN) syringe/vial injection 4 mg  4 mg Intravenous Q4HRS PRN Marizol Lees M.D.       • levETIRAcetam (KEPPRA) 500 mg in  mL IVPB  500 mg Intravenous BID Ventura Barbour M.D.       • [START ON 4/25/2019] levETIRAcetam (KEPPRA) tablet 500 mg  500 mg Oral BID Ventura Barbour M.D.           Allergies:  No Known Allergies    Past Medical History:  History reviewed. No pertinent past medical history.    Past Surgical History:  History reviewed. No pertinent surgical history.    Family History:  History reviewed. No pertinent family history.    Social History:  Social History     Social History   • Marital status:      Spouse name: N/A   • Number of children: N/A   • Years of education: N/A     Occupational History   • Not on file.     Social History Main Topics   • Smoking status: Current Every Day Smoker     Packs/day: 0.00     Types: Cigarettes     Last attempt to quit: 7/19/2016   • Smokeless tobacco: Never Used   • Alcohol use Yes       Comment: very rare   • Drug use: Yes     Types: Inhaled      Comment: marijuana occ   • Sexual activity: Yes     Partners: Female      Comment: NOT      Other Topics Concern   • Not on file     Social History Narrative   • No narrative on file       Physical Examination:  The patient is alert and oriented and is of fairly good recollection of the events; he recalls waking up at the site and everything that happened thereafter  Pupils 3 mm and reactive  Extraocular eye movements are intact  Facial sensation is intact  Face is symmetric  Follows commands with all 4 extremities  There is no focal weakness  Sensation is intact in all 4 extremities  There is no cervical tenderness to palpation in the midline, but he has some left-sided discomfort along the trapezius    Labs:  Recent Labs      04/24/19   1521   WBC  11.8*   RBC  5.17   HEMOGLOBIN  15.4   HEMATOCRIT  44.4   MCV  85.9   MCH  29.8   MCHC  34.7   RDW  38.5   PLATELETCT  191   MPV  9.5     Recent Labs      04/24/19   1521   SODIUM  137   POTASSIUM  3.7   CHLORIDE  103   CO2  25   GLUCOSE  116*   BUN  15   CREATININE  0.96   CALCIUM  9.7     Recent Labs      04/24/19   1521   APTT  31.7   INR  1.12           Imaging:  CT scan of the head shows fractures through the frontal bone bilaterally, and on the right it extends through the frontal sinus and into the roof of the orbit.  On the left side it does extend through the frontal sinus.  There are some fluid levels in the sinuses, and there is a small amount of intracranial air.  There is a small amount of traumatic subarachnoid blood along the falx.    Assessment and Plan:  Cecilio Melton is a 38-year-old male who had a work-related injury in which a large branch fell from a tree landing on his head.  He was briefly unconscious, but he is now awake and neurologically intact.  Follow-up CT head is pending.  The patient has been started on Keppra prophylactically.  From a neurosurgical point of view the  fractures are minor and do not require surgical intervention, however we do need to watch for CSF leak, and if there is a persistent CSF leak then surgery may need to be considered to address this.  There is no need for prophylactic antibiotics from a neurosurgical standpoint.  I recommend that he be monitored with neuro vital signs every hour overnight in the ICU, and if he is stable then I think he can be transferred to the floor in the morning.      Redd Perdomo M.D.

## 2019-04-25 NOTE — PROGRESS NOTES
Kailee PEOPLES with neurosurgery at bedside, assessed patient. Kailee PEOPLES will discuss POC with Dr. Perdomo and update bedside RN.

## 2019-04-25 NOTE — CARE PLAN
Problem: Knowledge Deficit  Goal: Knowledge of disease process/condition, treatment plan, diagnostic tests, and medications will improve    Intervention: Explain information regarding disease process/condition, treatment plan, diagnostic tests, and medications and document in education  Patient updated on plan of care for the shift. All questions answered and needs met at this time.       Problem: Pain Management  Goal: Pain level will decrease to patient's comfort goal    Intervention: Follow pain managment plan developed in collaboration with patient and Interdisciplinary Team  Pharmacological and nonpharmacological methods used to control pain. Pain assessed Q2.

## 2019-04-25 NOTE — CONSULTS
Oral and Maxillofacial Surgery   Facial Trauma Consultation     HPI - S/p tree branch/work injury earlier today. Transferred from outside hospital for management of intracranial and facial injuries.   Admitted to ICU.   PMH reviewed.   AFVSS.     Exam:   No acute distress   Frontal edema - minimal to moderate.   Minor abrasions   Periorbital edema and ecchymosis.   No appreciable depression in frontal bone/table.   Area tender as expected.   No major bony step offs. No lacs.   PERRL, EOMI, GVAI, no diploplia   Nose - stable.   Ears - WNL   Maxilla and mandible grossly stable.   RASHID normal  Occlusion S/R.   Subjective V 2 paresthesia. otherwise CN's intact.    CT Scan - minimally displaced frontal table/frontal bone fracture. MInimally Displaced and slightly comminuted posterior table fracture. Non-displaced posterior orbital fractures.    Pneumocephalus.     A/P: 38 year old male s/p tree branch to the face with anterior/posterior table frontal sinus fractures.     1. Anterior table - minimally displaced. No appreciable cosmetic deformity. No evidence of Nasal-frontal outflow tract obstruction. Likely non-op.     2. Posterior table - continue to monitor for CSF leak. If no leak likely non-op.     No other maxillofacial surgery recommendations     Mikayla

## 2019-04-25 NOTE — CARE PLAN
Problem: Knowledge Deficit  Goal: Knowledge of disease process/condition, treatment plan, diagnostic tests, and medications will improve    Intervention: Assess knowledge level of disease process/condition, treatment plan, diagnostic tests, and medications  Pt updated on POC. Demonstrated understanding by teach back. All questions answered.         Problem: Pain Management  Goal: Pain level will decrease to patient's comfort goal    Intervention: Follow pain managment plan developed in collaboration with patient and Interdisciplinary Team  Assess pain Q2H and prn.

## 2019-04-25 NOTE — PROGRESS NOTES
Neurosurgery Progress Note    Subjective:  Minimal headache, managed with tylenol qhs    Exam:  A&Ox4, speech fluent & appropriate  Ecchymosis & swelling present bilaterally in both eyelids, slightly worse on right  Facial movements symmetric, tongue midline  PERRL  Strength intact bilateral UE & LE, sensation grossly intact  Negative pronator drift    BP  Min: 117/68  Max: 128/68  Pulse  Av.4  Min: 45  Max: 89  Resp  Av.8  Min: 10  Max: 46  Temp  Av.8 °C (98.2 °F)  Min: 36.2 °C (97.1 °F)  Max: 37.4 °C (99.3 °F)  SpO2  Av.6 %  Min: 93 %  Max: 100 %    No Data Recorded    Recent Labs      19   1521  19   0320   WBC  11.8*  9.9   RBC  5.17  5.09   HEMOGLOBIN  15.4  15.2   HEMATOCRIT  44.4  44.0   MCV  85.9  86.4   MCH  29.8  29.9   MCHC  34.7  34.5   RDW  38.5  38.8   PLATELETCT  191  182   MPV  9.5  9.8     Recent Labs      19   1521  19   0320   SODIUM  137  137   POTASSIUM  3.7  3.8   CHLORIDE  103  106   CO2  25  21   GLUCOSE  116*  111*   BUN  15  12   CREATININE  0.96  0.82   CALCIUM  9.7  9.3     Recent Labs      19   1521   APTT  31.7   INR  1.12     Recent Labs      19   1521   REACTMIN  7.6   CLOTKINET  2.9   CLOTANGL  53.0   MAXCLOTS  57.3   [id number]  0.1   PRCINADP  11.6   PRCINAA  81.6       Intake/Output       19 0700 - 19 0659 19 07 - 19 0659       Total  Total       Intake    P.O.  0  210 210  --  -- --    P.O. 0 210 210 -- -- --    I.V.  173.3  1200 1373.3  200  -- 200    Pre-Hospital Volume 0 -- 0 -- -- --    Trauma Resuscitation Volume 0 -- 0 -- -- --    Volume (mL) (NS infusion) 173.3 1200 1373.3 200 -- 200    Blood  0  -- 0  --  -- --    PRBC Total Volume (Non-Barcoded) 0 -- 0 -- -- --    FFP Total Volume (Non-Barcoded) 0 -- 0 -- -- --    Platelets Total Volume (Non-Barcoded) 0 -- 0 -- -- --    Cryoprecipitate (Pooled) Total Volume (Non-Barcoded) 0 -- 0 -- -- --    Other  --  90  90  --  -- --    Medications (PO/Enteral Liquids) -- 90 90 -- -- --    Total Intake 173.3 1500 1673.3 200 -- 200       Output    Urine  0  1525 1525  0  -- 0    Number of Times Voided -- 3 x 3 x -- -- --    Urine Void (mL) 0 1525 1525 0 -- 0    Other  0  -- 0  --  -- --    Pre-Hospital Output 0 -- 0 -- -- --    Trauma Resuscitation Output 0 -- 0 -- -- --    Blood  0  -- 0  --  -- --    Est. Blood Loss 0 -- 0 -- -- --    Total Output 0 1525 1525 0 -- 0       Net I/O     173.3 -25 148.3 200 -- 200            Intake/Output Summary (Last 24 hours) at 04/25/19 0907  Last data filed at 04/25/19 0800   Gross per 24 hour   Intake          1873.33 ml   Output             1525 ml   Net           348.33 ml            • acetaminophen  650 mg Q4HRS PRN   • Respiratory Care per Protocol   Continuous RT   • Pharmacy Consult Request  1 Each PHARMACY TO DOSE   • docusate sodium  100 mg BID   • senna-docusate  1 Tab Nightly   • senna-docusate  1 Tab Q24HRS PRN   • polyethylene glycol/lytes  1 Packet BID   • magnesium hydroxide  30 mL DAILY   • bisacodyl  10 mg Q24HRS PRN   • fleet  1 Each Once PRN   • NS   Continuous   • morphine injection  4 mg Q HOUR PRN   • famotidine  20 mg BID    Or   • famotidine  20 mg BID   • ondansetron  4 mg Q4HRS PRN   • levETIRAcetam  500 mg BID       Assessment and Plan:  Hospital day #2 traumatic SAH  POD #NA  Prophylactic anticoagulation: no         Start date/time: tbd  Repeat head CT demonstrates 'Slight interval increase in extra-axial and intra-axial hemorrhage involving the anterior cranial fossa floor and inferior right frontal lobe'  Neurologically stable, no evidence of CSF leak  OK to floor from NSGY POV, q4hr NVS   Continue Keppra x7days  Facial fracture mgmt per Dr Degroot  I have updated Dr Perdomo

## 2019-04-26 VITALS
TEMPERATURE: 98.7 F | SYSTOLIC BLOOD PRESSURE: 122 MMHG | HEART RATE: 53 BPM | RESPIRATION RATE: 16 BRPM | WEIGHT: 153.66 LBS | DIASTOLIC BLOOD PRESSURE: 67 MMHG | OXYGEN SATURATION: 99 % | BODY MASS INDEX: 21.43 KG/M2

## 2019-04-26 PROCEDURE — 700102 HCHG RX REV CODE 250 W/ 637 OVERRIDE(OP): Performed by: NURSE PRACTITIONER

## 2019-04-26 PROCEDURE — A9270 NON-COVERED ITEM OR SERVICE: HCPCS | Performed by: NURSE PRACTITIONER

## 2019-04-26 PROCEDURE — 700102 HCHG RX REV CODE 250 W/ 637 OVERRIDE(OP): Performed by: SURGERY

## 2019-04-26 PROCEDURE — A9270 NON-COVERED ITEM OR SERVICE: HCPCS | Performed by: SURGERY

## 2019-04-26 RX ORDER — BUTALBITAL, ACETAMINOPHEN AND CAFFEINE 50; 325; 40 MG/1; MG/1; MG/1
1 TABLET ORAL EVERY 6 HOURS PRN
Qty: 4 TAB | Refills: 0 | Status: SHIPPED | OUTPATIENT
Start: 2019-04-26 | End: 2019-04-26

## 2019-04-26 RX ORDER — LEVETIRACETAM 500 MG/1
500 TABLET ORAL 2 TIMES DAILY
Qty: 5 TAB | Refills: 10 | Status: SHIPPED | OUTPATIENT
Start: 2019-04-26

## 2019-04-26 RX ORDER — LEVETIRACETAM 500 MG/1
500 TABLET ORAL 2 TIMES DAILY
Qty: 5 TAB | Refills: 10 | Status: SHIPPED | OUTPATIENT
Start: 2019-04-26 | End: 2019-04-26

## 2019-04-26 RX ORDER — BUTALBITAL, ACETAMINOPHEN AND CAFFEINE 50; 325; 40 MG/1; MG/1; MG/1
1 TABLET ORAL EVERY 6 HOURS PRN
Qty: 4 TAB | Refills: 0 | Status: SHIPPED | OUTPATIENT
Start: 2019-04-26 | End: 2019-04-28

## 2019-04-26 RX ADMIN — BUTALBITAL, ACETAMINOPHEN, AND CAFFEINE 1 TABLET: 50; 325; 40 TABLET ORAL at 09:02

## 2019-04-26 RX ADMIN — LEVETIRACETAM 500 MG: 500 TABLET ORAL at 06:16

## 2019-04-26 RX ADMIN — ACETAMINOPHEN 650 MG: 325 TABLET, FILM COATED ORAL at 06:16

## 2019-04-26 RX ADMIN — ACETAMINOPHEN 650 MG: 325 TABLET, FILM COATED ORAL at 00:56

## 2019-04-26 ASSESSMENT — LIFESTYLE VARIABLES: SUBSTANCE_ABUSE: 0

## 2019-04-26 ASSESSMENT — ENCOUNTER SYMPTOMS
SHORTNESS OF BREATH: 0
BLURRED VISION: 0
SPEECH CHANGE: 0
NECK PAIN: 0
FEVER: 0
POLYDIPSIA: 0
ABDOMINAL PAIN: 0
SENSORY CHANGE: 0

## 2019-04-26 NOTE — PROGRESS NOTES
Neurosurgery Progress Note    Subjective:  Minimal headache, managed with tylenol qhs    Exam:  A&Ox4, speech fluent & appropriate  Ecchymosis & swelling present bilaterally in both eyelids, slightly worse on right  Facial movements symmetric    BP  Min: 117/80  Max: 132/71  Pulse  Av  Min: 45  Max: 71  Resp  Av.7  Min: 12  Max: 27  Temp  Av.8 °C (98.2 °F)  Min: 36.5 °C (97.7 °F)  Max: 37.1 °C (98.7 °F)  SpO2  Av.7 %  Min: 95 %  Max: 100 %    No Data Recorded    Recent Labs      19   1521  19   0320   WBC  11.8*  9.9   RBC  5.17  5.09   HEMOGLOBIN  15.4  15.2   HEMATOCRIT  44.4  44.0   MCV  85.9  86.4   MCH  29.8  29.9   MCHC  34.7  34.5   RDW  38.5  38.8   PLATELETCT  191  182   MPV  9.5  9.8     Recent Labs      19   1521  19   0320   SODIUM  137  137   POTASSIUM  3.7  3.8   CHLORIDE  103  106   CO2  25  21   GLUCOSE  116*  111*   BUN  15  12   CREATININE  0.96  0.82   CALCIUM  9.7  9.3     Recent Labs      19   1521   APTT  31.7   INR  1.12     Recent Labs      19   1521   REACTMIN  7.6   CLOTKINET  2.9   CLOTANGL  53.0   MAXCLOTS  57.3   [id number]  0.1   PRCINADP  11.6   PRCINAA  81.6       Intake/Output       19 0700 - 19 0659 19 07 - 19 0659       Total [id number] Total       Intake    P.O.  120  -- 120  120  -- 120    P.O. 120 -- 120 120 -- 120    I.V.  1000  -- 1000  --  -- --    Volume (mL) (NS infusion) 1000 -- 1000 -- -- --    IV Piggyback  0  -- 0  --  -- --    Volume (mL) (levETIRAcetam (KEPPRA) 500 mg in  mL IVPB) 0 -- 0 -- -- --    Total Intake 1120 -- 1120 120 -- 120       Output    Urine  1200  -- 1200  --  -- --    Urine Void (mL) 1200 -- 1200 -- -- --    Total Output 1200 -- 1200 -- -- --       Net I/O     -80 -- -80 120 -- 120            Intake/Output Summary (Last 24 hours) at 19 0858  Last data filed at 19 0800   Gross per 24 hour   Intake              920 ml   Output              1200 ml   Net             -280 ml            • acetaminophen  650 mg Q4HRS PRN   • acetaminophen/caffeine/butalbital 325-40-50 mg  1 Tab Q6HRS PRN   • Respiratory Care per Protocol   Continuous RT   • Pharmacy Consult Request  1 Each PHARMACY TO DOSE   • docusate sodium  100 mg BID   • senna-docusate  1 Tab Nightly   • senna-docusate  1 Tab Q24HRS PRN   • polyethylene glycol/lytes  1 Packet BID   • magnesium hydroxide  30 mL DAILY   • bisacodyl  10 mg Q24HRS PRN   • fleet  1 Each Once PRN   • NS   Continuous   • morphine injection  4 mg Q HOUR PRN   • ondansetron  4 mg Q4HRS PRN   • levETIRAcetam  500 mg BID       Assessment and Plan:  Hospital day #3 traumatic SAH  POD #NA  Prophylactic anticoagulation: no         Start date/time: tbd  Neurologically stable, no evidence of CSF leak  Continue Keppra x7days  OK to DC home with outpt follow up  Pt & his significant other were instructed to call our office with any new drainage from ears/nose, new complaints. Discussed post-concussive syndrome care  Seen in conjunction with Dr Perdomo

## 2019-04-26 NOTE — DISCHARGE INSTRUCTIONS
Discharge Instructions    Discharged to home by car with relative. Discharged via walking, hospital escort: Yes.  Special equipment needed: Not Applicable    Be sure to schedule a follow-up appointment with your primary care doctor or any specialists as instructed.     Discharge Plan:   Smoking Cessation Offered: Patient Counseled  Pneumococcal Vaccine Administered/Refused: Not given - Patient refused pneumococcal vaccine  Influenza Vaccine Indication: Patient Refuses    I understand that a diet low in cholesterol, fat, and sodium is recommended for good health. Unless I have been given specific instructions below for another diet, I accept this instruction as my diet prescription.   Other diet: Regular    Special Instructions: None    · Is patient discharged on Warfarin / Coumadin?   No     Depression / Suicide Risk    As you are discharged from this [facility] facility, it is important to learn how to keep safe from harming yourself.    Recognize the warning signs:  · Abrupt changes in personality, positive or negative- including increase in energy   · Giving away possessions  · Change in eating patterns- significant weight changes-  positive or negative  · Change in sleeping patterns- unable to sleep or sleeping all the time   · Unwillingness or inability to communicate  · Depression  · Unusual sadness, discouragement and loneliness  · Talk of wanting to die  · Neglect of personal appearance   · Rebelliousness- reckless behavior  · Withdrawal from people/activities they love  · Confusion- inability to concentrate     If you or a loved one observes any of these behaviors or has concerns about self-harm, here's what you can do:  · Talk about it- your feelings and reasons for harming yourself  · Remove any means that you might use to hurt yourself (examples: pills, rope, extension cords, firearm)  · Get professional help from the community (Mental Health, Substance Abuse, psychological counseling)  · Do not be  alone:Call your Safe Contact- someone whom you trust who will be there for you.  · Call your local CRISIS HOTLINE 314-0481 or 503-925-0438  · Call your local Children's Mobile Crisis Response Team [place] (636) 742-6014 or www.Jalbum  · Call the toll free National Suicide Prevention Hotlines   · National Suicide Prevention Lifeline 124-882-WZAE (5140)  · [employer] Network 800-SUICIDE (211-0527)      Subarachnoid Hemorrhage  Subarachnoid hemorrhage is bleeding in the area between the brain and the membrane that covers the brain. The bleeding puts more pressure on the brain and stops blood from reaching some areas of the brain. It is very serious. It may cause brain damage, stroke, or death if not treated. You must be treated in the hospital right away.  What increases the risk?  You may be more likely to have this condition if you:  · Smoke.  · Have high blood pressure (hypertension).  · Drink too much alcohol.  · Are a female, especially after menopause.  · Have a family history of disease in the blood vessels of the brain.  · Have a certain inherited kidney disease or connective tissue disease.  What are the signs or symptoms?  · Having a sudden, severe headache.  · Feeling sick to your stomach (nauseous) or throwing up (vomiting) combined with other problems.  · Suddenly feeling weak.  · Losing feeling on your face, arm, or leg, especially on one side of the body.  · Suddenly having trouble walking or moving your arms or legs.  · Suddenly feeling confused.  · Suddenly having a change in mood or personality.  · Having trouble talking or understanding.  · Having trouble swallowing.  · Suddenly having trouble seeing.  · Seeing double.  · Feeling dizzy.  · Losing your balance or coordination.  · Having light bother or hurt your eyes.  · Having a stiff neck.  Follow these instructions at home:  · Take all medicines exactly as told by your doctor.  · Eat healthy foods if you can swallow.  ¨ Eat  foods that are low in salt and cholesterol.  ¨ Eat foods that are low in saturated and trans fat.  ¨ If told, eat soft or pureed foods so that you do not choke.  ¨ If told, take small bites of food so that you do not choke.  · Rest as told by your doctor.  · Limit your activity as told by your doctor.  · Do not smoke.  · Limit how much alcohol you drink.  ¨ Men-drink no more than 2 drinks a day.  ¨ Women who are not pregnant-drink no more than 1 drink a day.  · Make changes to your lifestyle as told by your doctor.  · Keep track of your blood pressure as told by your doctor.  · Keep your home safe so you do not fall.  ¨ Put grab bars in the bedroom and bathroom.  ¨ Raise toilet seats.  ¨ Put a seat in the shower.  · Go to therapy sessions as told by your doctor. This may include physical, occupational, and speech therapy.  · Use a walker or cane at all times, if told to do so.  · Keep all follow-up visits with your doctor and other specialists.  Get help right away if:  · You have a sudden, severe headache with no known cause.  · You are sick to your stomach or throw up, and have another problem.  · You have a sudden weakness.  · You lose feeling on one side of your body.  · You suddenly have trouble walking or moving arms or legs.  · You suddenly feel confused.  · You have trouble talking or understanding.  · You suddenly have trouble seeing.  · You lose your balance or your movements are not coordinated.  · You have a stiff neck.  · You have trouble breathing.  · You are partly or totally unaware of what is going on around you.  The symptoms above may be a sign of a serious problem that is an emergency. Do not wait to see if the symptoms will go away. Get medical help right away. Call your local emergency services (911 in [place] Do not drive yourself to the hospital.   This information is not intended to replace advice given to you by your health care provider. Make sure you discuss any questions you have with  your health care provider.  Document Released: 04/14/2014 Document Revised: 05/25/2017 Document Reviewed: 01/31/2014  Elsevier Interactive Patient Education © 2017 [employer]

## 2019-04-26 NOTE — PROGRESS NOTES
Assumed care at 1900 and received bed side report. Pt. Is A&OX4. No c/o pain or discomfort. Resting quietly in bed. SO at bedside. Hourly rounding initiated. Vital signs stable. Call light within reach. Will continue to monitor.

## 2019-04-26 NOTE — PROGRESS NOTES
Report called to Megan, patient transferring to S193.1. Pt updated on POC. Demonstrated understanding by teach back. All questions answered.
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
## 2019-04-26 NOTE — CARE PLAN
Problem: Communication  Goal: The ability to communicate needs accurately and effectively will improve  Outcome: PROGRESSING AS EXPECTED  Able to communicate needs and wants effectively.     Problem: Mobility  Goal: Risk for activity intolerance will decrease  Outcome: PROGRESSING AS EXPECTED  Pt. Up ad sean and tolerating well.